# Patient Record
Sex: FEMALE | Race: WHITE | NOT HISPANIC OR LATINO | URBAN - METROPOLITAN AREA
[De-identification: names, ages, dates, MRNs, and addresses within clinical notes are randomized per-mention and may not be internally consistent; named-entity substitution may affect disease eponyms.]

---

## 2017-04-07 ENCOUNTER — EMERGENCY (EMERGENCY)
Facility: HOSPITAL | Age: 27
LOS: 1 days | End: 2017-04-07
Attending: INTERNAL MEDICINE | Admitting: INTERNAL MEDICINE
Payer: COMMERCIAL

## 2017-04-07 VITALS
DIASTOLIC BLOOD PRESSURE: 68 MMHG | HEIGHT: 63 IN | RESPIRATION RATE: 16 BRPM | WEIGHT: 158.73 LBS | OXYGEN SATURATION: 98 % | TEMPERATURE: 98 F | HEART RATE: 88 BPM | SYSTOLIC BLOOD PRESSURE: 119 MMHG

## 2017-04-07 VITALS
TEMPERATURE: 98 F | HEART RATE: 74 BPM | DIASTOLIC BLOOD PRESSURE: 71 MMHG | RESPIRATION RATE: 14 BRPM | SYSTOLIC BLOOD PRESSURE: 120 MMHG | OXYGEN SATURATION: 96 %

## 2017-04-07 DIAGNOSIS — Z90.49 ACQUIRED ABSENCE OF OTHER SPECIFIED PARTS OF DIGESTIVE TRACT: Chronic | ICD-10-CM

## 2017-04-07 PROCEDURE — 96374 THER/PROPH/DIAG INJ IV PUSH: CPT

## 2017-04-07 PROCEDURE — 72040 X-RAY EXAM NECK SPINE 2-3 VW: CPT

## 2017-04-07 PROCEDURE — 99284 EMERGENCY DEPT VISIT MOD MDM: CPT | Mod: 25

## 2017-04-07 PROCEDURE — 72040 X-RAY EXAM NECK SPINE 2-3 VW: CPT | Mod: 26

## 2017-04-07 PROCEDURE — 99284 EMERGENCY DEPT VISIT MOD MDM: CPT

## 2017-04-07 RX ORDER — KETOROLAC TROMETHAMINE 30 MG/ML
60 SYRINGE (ML) INJECTION ONCE
Qty: 0 | Refills: 0 | Status: DISCONTINUED | OUTPATIENT
Start: 2017-04-07 | End: 2017-04-07

## 2017-04-07 RX ORDER — IBUPROFEN 200 MG
600 TABLET ORAL ONCE
Qty: 0 | Refills: 0 | Status: COMPLETED | OUTPATIENT
Start: 2017-04-07 | End: 2017-04-07

## 2017-04-07 RX ORDER — CYCLOBENZAPRINE HYDROCHLORIDE 10 MG/1
1 TABLET, FILM COATED ORAL
Qty: 21 | Refills: 0 | OUTPATIENT
Start: 2017-04-07 | End: 2017-04-14

## 2017-04-07 RX ORDER — DIAZEPAM 5 MG
5 TABLET ORAL ONCE
Qty: 0 | Refills: 0 | Status: DISCONTINUED | OUTPATIENT
Start: 2017-04-07 | End: 2017-04-07

## 2017-04-07 RX ADMIN — Medication 60 MILLIGRAM(S): at 18:00

## 2017-04-07 RX ADMIN — Medication 60 MILLIGRAM(S): at 17:13

## 2017-04-07 RX ADMIN — Medication 600 MILLIGRAM(S): at 16:29

## 2017-04-07 RX ADMIN — Medication 600 MILLIGRAM(S): at 15:48

## 2017-04-07 RX ADMIN — Medication 5 MILLIGRAM(S): at 15:48

## 2017-04-07 NOTE — ED ADULT NURSE NOTE - OBJECTIVE STATEMENT
Patient walked into ER with mother c/o right neck pain upon awakening at 4:30 AM.  Pain constant and severe.  Patient has neck cushion around neck for comfort.

## 2017-04-07 NOTE — ED PROVIDER NOTE - OBJECTIVE STATEMENT
at 430 am today was moving head and felt pain that also radiatesto both sides upper back.no h/o same, no paresthesias or weakness..pain now somewhat better than earlier.no fever,trauma....

## 2018-03-23 NOTE — ED PROVIDER NOTE - CPE EDP GASTRO NORM
3/27/2018    Humza Moore   [address]  [facility] [address]     Dear Humza:    I have reviewed your lab work done recently at the office.  Please take note of the following results and recommendations.              Normal Values          Results  Sodium       135-145 142   Creatinine      0.50-1.10 1.42   Potassium        3.4-5.1 4.2   Blood Sugar         65-99 183                       Glycohemogloin (Hgb A1c)     Less than 7 7.6   Total Cholesterol   Less than 200    139   Triglycerides   Less than 150 113   HDL (good cholesterol)      Above 50 57   LDL (bad cholesterol)   Less than 100 59   AST (liver enzyme)   Less than 38 17   ALT (liver enzyme)   Less than 79   29   White blood cell 4.2 - 11.0    Hemoglobin 12.0-15.5    Hematocrit 36.0 - 46.5    Platelets 140-450    Other:         Recommendations:  Per phone call here is your test results. I hope all is going well for you.  If you have any questions or concerns, please feel free to call the office at [phone].    Sincerely,        Delroy Loya M.D.  [address] [place] [address]  
normal...

## 2024-01-07 ENCOUNTER — EMERGENCY (EMERGENCY)
Facility: HOSPITAL | Age: 34
LOS: 1 days | Discharge: HOME | End: 2024-01-07
Attending: EMERGENCY MEDICINE | Admitting: EMERGENCY MEDICINE
Payer: COMMERCIAL

## 2024-01-07 VITALS
SYSTOLIC BLOOD PRESSURE: 128 MMHG | WEIGHT: 184.97 LBS | HEIGHT: 63 IN | RESPIRATION RATE: 20 BRPM | HEART RATE: 109 BPM | OXYGEN SATURATION: 98 % | TEMPERATURE: 99 F | DIASTOLIC BLOOD PRESSURE: 83 MMHG

## 2024-01-07 DIAGNOSIS — Z90.49 ACQUIRED ABSENCE OF OTHER SPECIFIED PARTS OF DIGESTIVE TRACT: Chronic | ICD-10-CM

## 2024-01-07 LAB
ALBUMIN SERPL ELPH-MCNC: 3.8 G/DL — SIGNIFICANT CHANGE UP (ref 3.3–5)
ALBUMIN SERPL ELPH-MCNC: 3.8 G/DL — SIGNIFICANT CHANGE UP (ref 3.3–5)
ALP SERPL-CCNC: 73 U/L — SIGNIFICANT CHANGE UP (ref 40–120)
ALP SERPL-CCNC: 73 U/L — SIGNIFICANT CHANGE UP (ref 40–120)
ALT FLD-CCNC: 29 U/L — SIGNIFICANT CHANGE UP (ref 12–78)
ALT FLD-CCNC: 29 U/L — SIGNIFICANT CHANGE UP (ref 12–78)
ANION GAP SERPL CALC-SCNC: 0 MMOL/L — LOW (ref 5–17)
ANION GAP SERPL CALC-SCNC: 0 MMOL/L — LOW (ref 5–17)
AST SERPL-CCNC: 23 U/L — SIGNIFICANT CHANGE UP (ref 15–37)
AST SERPL-CCNC: 23 U/L — SIGNIFICANT CHANGE UP (ref 15–37)
BASOPHILS # BLD AUTO: 0.08 K/UL — SIGNIFICANT CHANGE UP (ref 0–0.2)
BASOPHILS # BLD AUTO: 0.08 K/UL — SIGNIFICANT CHANGE UP (ref 0–0.2)
BASOPHILS NFR BLD AUTO: 1 % — SIGNIFICANT CHANGE UP (ref 0–2)
BASOPHILS NFR BLD AUTO: 1 % — SIGNIFICANT CHANGE UP (ref 0–2)
BILIRUB SERPL-MCNC: 0.3 MG/DL — SIGNIFICANT CHANGE UP (ref 0.2–1.2)
BILIRUB SERPL-MCNC: 0.3 MG/DL — SIGNIFICANT CHANGE UP (ref 0.2–1.2)
BUN SERPL-MCNC: 9 MG/DL — SIGNIFICANT CHANGE UP (ref 7–23)
BUN SERPL-MCNC: 9 MG/DL — SIGNIFICANT CHANGE UP (ref 7–23)
CALCIUM SERPL-MCNC: 8.3 MG/DL — LOW (ref 8.5–10.1)
CALCIUM SERPL-MCNC: 8.3 MG/DL — LOW (ref 8.5–10.1)
CHLORIDE SERPL-SCNC: 109 MMOL/L — HIGH (ref 96–108)
CHLORIDE SERPL-SCNC: 109 MMOL/L — HIGH (ref 96–108)
CO2 SERPL-SCNC: 28 MMOL/L — SIGNIFICANT CHANGE UP (ref 22–31)
CO2 SERPL-SCNC: 28 MMOL/L — SIGNIFICANT CHANGE UP (ref 22–31)
CREAT SERPL-MCNC: 0.64 MG/DL — SIGNIFICANT CHANGE UP (ref 0.5–1.3)
CREAT SERPL-MCNC: 0.64 MG/DL — SIGNIFICANT CHANGE UP (ref 0.5–1.3)
EGFR: 119 ML/MIN/1.73M2 — SIGNIFICANT CHANGE UP
EGFR: 119 ML/MIN/1.73M2 — SIGNIFICANT CHANGE UP
EOSINOPHIL # BLD AUTO: 0.29 K/UL — SIGNIFICANT CHANGE UP (ref 0–0.5)
EOSINOPHIL # BLD AUTO: 0.29 K/UL — SIGNIFICANT CHANGE UP (ref 0–0.5)
EOSINOPHIL NFR BLD AUTO: 3.6 % — SIGNIFICANT CHANGE UP (ref 0–6)
EOSINOPHIL NFR BLD AUTO: 3.6 % — SIGNIFICANT CHANGE UP (ref 0–6)
GLUCOSE SERPL-MCNC: 98 MG/DL — SIGNIFICANT CHANGE UP (ref 70–99)
GLUCOSE SERPL-MCNC: 98 MG/DL — SIGNIFICANT CHANGE UP (ref 70–99)
HCG SERPL-ACNC: <1 MIU/ML — SIGNIFICANT CHANGE UP
HCG SERPL-ACNC: <1 MIU/ML — SIGNIFICANT CHANGE UP
HCT VFR BLD CALC: 39.4 % — SIGNIFICANT CHANGE UP (ref 34.5–45)
HCT VFR BLD CALC: 39.4 % — SIGNIFICANT CHANGE UP (ref 34.5–45)
HGB BLD-MCNC: 13.4 G/DL — SIGNIFICANT CHANGE UP (ref 11.5–15.5)
HGB BLD-MCNC: 13.4 G/DL — SIGNIFICANT CHANGE UP (ref 11.5–15.5)
IMM GRANULOCYTES NFR BLD AUTO: 0.2 % — SIGNIFICANT CHANGE UP (ref 0–0.9)
IMM GRANULOCYTES NFR BLD AUTO: 0.2 % — SIGNIFICANT CHANGE UP (ref 0–0.9)
LYMPHOCYTES # BLD AUTO: 2.95 K/UL — SIGNIFICANT CHANGE UP (ref 1–3.3)
LYMPHOCYTES # BLD AUTO: 2.95 K/UL — SIGNIFICANT CHANGE UP (ref 1–3.3)
LYMPHOCYTES # BLD AUTO: 36.3 % — SIGNIFICANT CHANGE UP (ref 13–44)
LYMPHOCYTES # BLD AUTO: 36.3 % — SIGNIFICANT CHANGE UP (ref 13–44)
MCHC RBC-ENTMCNC: 29.9 PG — SIGNIFICANT CHANGE UP (ref 27–34)
MCHC RBC-ENTMCNC: 29.9 PG — SIGNIFICANT CHANGE UP (ref 27–34)
MCHC RBC-ENTMCNC: 34 GM/DL — SIGNIFICANT CHANGE UP (ref 32–36)
MCHC RBC-ENTMCNC: 34 GM/DL — SIGNIFICANT CHANGE UP (ref 32–36)
MCV RBC AUTO: 87.9 FL — SIGNIFICANT CHANGE UP (ref 80–100)
MCV RBC AUTO: 87.9 FL — SIGNIFICANT CHANGE UP (ref 80–100)
MONOCYTES # BLD AUTO: 0.68 K/UL — SIGNIFICANT CHANGE UP (ref 0–0.9)
MONOCYTES # BLD AUTO: 0.68 K/UL — SIGNIFICANT CHANGE UP (ref 0–0.9)
MONOCYTES NFR BLD AUTO: 8.4 % — SIGNIFICANT CHANGE UP (ref 2–14)
MONOCYTES NFR BLD AUTO: 8.4 % — SIGNIFICANT CHANGE UP (ref 2–14)
NEUTROPHILS # BLD AUTO: 4.11 K/UL — SIGNIFICANT CHANGE UP (ref 1.8–7.4)
NEUTROPHILS # BLD AUTO: 4.11 K/UL — SIGNIFICANT CHANGE UP (ref 1.8–7.4)
NEUTROPHILS NFR BLD AUTO: 50.5 % — SIGNIFICANT CHANGE UP (ref 43–77)
NEUTROPHILS NFR BLD AUTO: 50.5 % — SIGNIFICANT CHANGE UP (ref 43–77)
NRBC # BLD: 0 /100 WBCS — SIGNIFICANT CHANGE UP (ref 0–0)
NRBC # BLD: 0 /100 WBCS — SIGNIFICANT CHANGE UP (ref 0–0)
PLATELET # BLD AUTO: 249 K/UL — SIGNIFICANT CHANGE UP (ref 150–400)
PLATELET # BLD AUTO: 249 K/UL — SIGNIFICANT CHANGE UP (ref 150–400)
POTASSIUM SERPL-MCNC: 3.9 MMOL/L — SIGNIFICANT CHANGE UP (ref 3.5–5.3)
POTASSIUM SERPL-MCNC: 3.9 MMOL/L — SIGNIFICANT CHANGE UP (ref 3.5–5.3)
POTASSIUM SERPL-SCNC: 3.9 MMOL/L — SIGNIFICANT CHANGE UP (ref 3.5–5.3)
POTASSIUM SERPL-SCNC: 3.9 MMOL/L — SIGNIFICANT CHANGE UP (ref 3.5–5.3)
PROT SERPL-MCNC: 7.2 G/DL — SIGNIFICANT CHANGE UP (ref 6–8.3)
PROT SERPL-MCNC: 7.2 G/DL — SIGNIFICANT CHANGE UP (ref 6–8.3)
RBC # BLD: 4.48 M/UL — SIGNIFICANT CHANGE UP (ref 3.8–5.2)
RBC # BLD: 4.48 M/UL — SIGNIFICANT CHANGE UP (ref 3.8–5.2)
RBC # FLD: 12.9 % — SIGNIFICANT CHANGE UP (ref 10.3–14.5)
RBC # FLD: 12.9 % — SIGNIFICANT CHANGE UP (ref 10.3–14.5)
SODIUM SERPL-SCNC: 137 MMOL/L — SIGNIFICANT CHANGE UP (ref 135–145)
SODIUM SERPL-SCNC: 137 MMOL/L — SIGNIFICANT CHANGE UP (ref 135–145)
TROPONIN I, HIGH SENSITIVITY RESULT: 4.6 NG/L — SIGNIFICANT CHANGE UP
TROPONIN I, HIGH SENSITIVITY RESULT: 4.6 NG/L — SIGNIFICANT CHANGE UP
WBC # BLD: 8.13 K/UL — SIGNIFICANT CHANGE UP (ref 3.8–10.5)
WBC # BLD: 8.13 K/UL — SIGNIFICANT CHANGE UP (ref 3.8–10.5)
WBC # FLD AUTO: 8.13 K/UL — SIGNIFICANT CHANGE UP (ref 3.8–10.5)
WBC # FLD AUTO: 8.13 K/UL — SIGNIFICANT CHANGE UP (ref 3.8–10.5)

## 2024-01-07 PROCEDURE — 70450 CT HEAD/BRAIN W/O DYE: CPT | Mod: 26,MA

## 2024-01-07 PROCEDURE — 99285 EMERGENCY DEPT VISIT HI MDM: CPT | Mod: 25

## 2024-01-07 PROCEDURE — 84702 CHORIONIC GONADOTROPIN TEST: CPT

## 2024-01-07 PROCEDURE — 36415 COLL VENOUS BLD VENIPUNCTURE: CPT

## 2024-01-07 PROCEDURE — 84484 ASSAY OF TROPONIN QUANT: CPT

## 2024-01-07 PROCEDURE — 93010 ELECTROCARDIOGRAM REPORT: CPT

## 2024-01-07 PROCEDURE — 93005 ELECTROCARDIOGRAM TRACING: CPT

## 2024-01-07 PROCEDURE — 80053 COMPREHEN METABOLIC PANEL: CPT

## 2024-01-07 PROCEDURE — 71046 X-RAY EXAM CHEST 2 VIEWS: CPT

## 2024-01-07 PROCEDURE — 71046 X-RAY EXAM CHEST 2 VIEWS: CPT | Mod: 26

## 2024-01-07 PROCEDURE — 82962 GLUCOSE BLOOD TEST: CPT

## 2024-01-07 PROCEDURE — 70450 CT HEAD/BRAIN W/O DYE: CPT | Mod: MA

## 2024-01-07 PROCEDURE — 85025 COMPLETE CBC W/AUTO DIFF WBC: CPT

## 2024-01-07 PROCEDURE — 99285 EMERGENCY DEPT VISIT HI MDM: CPT

## 2024-01-07 RX ORDER — SODIUM CHLORIDE 9 MG/ML
1000 INJECTION INTRAMUSCULAR; INTRAVENOUS; SUBCUTANEOUS ONCE
Refills: 0 | Status: COMPLETED | OUTPATIENT
Start: 2024-01-07 | End: 2024-01-07

## 2024-01-07 RX ADMIN — SODIUM CHLORIDE 1000 MILLILITER(S): 9 INJECTION INTRAMUSCULAR; INTRAVENOUS; SUBCUTANEOUS at 18:32

## 2024-01-07 NOTE — ED PROVIDER NOTE - NSFOLLOWUPINSTRUCTIONS_ED_ALL_ED_FT
Follow up with your primary care physician within 2-3 days.  Follow up with the cardiologist as discussed. Take the copy of your test results you were given in the emergency room for your doctor to review.     Stay hydrated    Return to the ER if your symptoms worsen or for any other medical emergencies  ************    Closed Head Injury    A closed head injury is an injury to your head that may or may not involve a traumatic brain injury (TBI). Symptoms of TBI can be short or long lasting and include headache, dizziness, interference with memory or speech, fatigue, confusion, changes in sleep, mood changes, nausea, depression/anxiety, and dulling of senses. Make sure to obtain proper rest which includes getting plenty of sleep, avoiding excessive visual stimulation, and avoiding activities that may cause physical or mental stress. Avoid any situation where there is potential for another head injury, including sports.    SEEK IMMEDIATE MEDICAL CARE IF YOU HAVE ANY OF THE FOLLOWING SYMPTOMS: unusual drowsiness, vomiting, severe dizziness, seizures, lightheadedness, muscular weakness, different pupil sizes, visual changes, or clear or bloody discharge from your ears or nose.

## 2024-01-07 NOTE — ED PROVIDER NOTE - CARE PROVIDERS DIRECT ADDRESSES
[email or web address]

## 2024-01-07 NOTE — ED ADULT NURSE NOTE - CHIEF COMPLAINT QUOTE
Patient BIB  with c/o she was eating something, something got stuck in esophagus, water would not dislodge, tried to run to back door to throw up and blacked out and fell, reports she woke up on her knees, reports she was shaking but "as I was coming to I was shaking".  reports she hit her head on the door. Patient reports after event her family gave her orange juice and drink with electrolytes. Patient reports she did not drink much water yesterday, GOT  YESTERDAY, Patient denies significant PMHx.  in triage. Patient tearful and crying in triage,  reports they are going on honeymoon soon and he wants to make sure her head is OK.

## 2024-01-07 NOTE — ED ADULT TRIAGE NOTE - CHIEF COMPLAINT QUOTE
Patient BIB  with c/o she was eating something, something got stuck in esophagus, water would not dislodge, tried to run to back door to throw up and blacked out and fell, reports she woke up on her knees, reports she was shaking but "as I was coming to I was shaking".  reports she hit her head on the door. Patient reports after event her family gave her orange juice and drink with electrolyes. Patient reports she did not drink much water yesterday, GOT  YESTERDAY, Patient denies significant PMHx.  in triage. Patient BIB  with c/o she was eating something, something got stuck in esophagus, water would not dislodge, tried to run to back door to throw up and blacked out and fell, reports she woke up on her knees, reports she was shaking but "as I was coming to I was shaking".  reports she hit her head on the door. Patient reports after event her family gave her orange juice and drink with electrolytes. Patient reports she did not drink much water yesterday, GOT  YESTERDAY, Patient denies significant PMHx.  in triage. Patient tearful and crying in triage,  reports they are going on honeymoon soon and he wants to make sure her head is OK.

## 2024-01-07 NOTE — ED PROVIDER NOTE - CARE PLAN
1 Principal Discharge DX:	[facility] (closed head injury), initial encounter  Secondary Diagnosis:	Choking episode

## 2024-01-07 NOTE — ED ADULT NURSE REASSESSMENT NOTE - NS ED NURSE REASSESS COMMENT FT1
pt reeval and cleared for safe discharge as per MD. pt alert. pt ambulates with steady gait. IV access removed. pt given discharge instructions and teaching paperwork.

## 2024-01-07 NOTE — ED ADULT NURSE NOTE - NSFALLUNIVINTERV_ED_ALL_ED
Bed/Stretcher in lowest position, wheels locked, appropriate side rails in place/Call bell, personal items and telephone in reach/Instruct patient to call for assistance before getting out of bed/chair/stretcher/Non-slip footwear applied when patient is off stretcher/[place] to call system/Physically safe environment - no spills, clutter or unnecessary equipment/Purposeful proactive rounding/Room/bathroom lighting operational, light cord in reach Bed/Stretcher in lowest position, wheels locked, appropriate side rails in place/Call bell, personal items and telephone in reach/Instruct patient to call for assistance before getting out of bed/chair/stretcher/Non-slip footwear applied when patient is off stretcher/[place] to call system/Physically safe environment - no spills, clutter or unnecessary equipment/Purposeful proactive rounding/Room/bathroom lighting operational, light cord in reach

## 2024-01-07 NOTE — ED PROVIDER NOTE - PHYSICAL EXAMINATION
Gen: Well appearing in NAD.   Eyes: PERRLA, EOMI   ENT: oral mucosa dry   Head: +small superficial abrasion on the left forehead  Heart: s1/s2, RRR  Lung: CTA b/l  Abd: soft, NT/ND, no rebound or guarding   Msk: No C-spine or mid spinal tenderness to palpation.  Patient ambulatory in the ED with normal gait.  Neuro: AAO x3, patient moving all extremity equally, no focal neuro deficits noted  Skin: Normal for race.  Psych: Alert and oriented

## 2024-01-07 NOTE — ED PROVIDER NOTE - OBJECTIVE STATEMENT
33-year-old female no reported past medical history presents to the ED with complaint of a choking episode this morning which caused her to pass out and hit her head for a few seconds.  Event was witnessed by family members at home.  Patient reports she no longer feels like food is stuck in her esophagus however per  at bedside they came because they wanted a CAT scan for the head, being that they are going on honeymoon tomorrow and just wanted to make sure everything was okay.  Patient reports they did get  yesterday and she was not hydrating or eating yesterday, they were drinking all night.  Reports she was eating a bagel this morning when she choked on it.  Reports she feels better now.  Denies any chest pain, shortness of breath, headache, visual changes, nausea vomiting, abdominal pain or all other complaints

## 2024-01-07 NOTE — ED PROVIDER NOTE - CLINICAL SUMMARY MEDICAL DECISION MAKING FREE TEXT BOX
33-year-old female with no significant past medical history presents with was  yesterday, was celebrating and had a large party.  Patient had very little to eat during all this.  This morning she finally went to eat, had a bagel and ended up choking on the bagel.  Patient states the bagel got stuck in her esophagus, she did not have any shortness of breath or coughing during the episode.  Patient tried to drink some water, vomited and then passed out briefly.  Patient may have hit her head.  LOC for only a few seconds.  Patient never had chest pain or shortness of breath.  No acute palpitations.  Patient has been feeling dehydrated today, some fatigue.  Patient is not having any other shortness of breath or chest pain.  No dyspnea on exertion or easy fatigue.  No further passing out or near syncopal episodes.  No neck or back pain.  No numbness or tingling.  Patient is having some headache today.  Patient came to the ED for a CAT scan.  No aggravating or alleviating factors otherwise noted.  No other acute injury or complaints.  Exam: Nontoxic, well-appearing.  Normal respiratory effort.  CTA BL, no W/R/R.  Normal cardiac exam.  Abdomen soft, nontender, nondistended.  No external signs of significant head trauma.  No spinal tenderness in cervical, thoracic, or lumbar.  Normal nonfocal neurologic exam.  No C/C/E.  No other acute findings on exam.  Acute syncopal episode status post partial choking episode.  No signs of aspiration or URI symptoms.  Neurologically intact.  Patient now much improved after IV fluids.  Labs, CT, outpatient follow-up

## 2024-01-07 NOTE — ED ADULT NURSE REASSESSMENT NOTE - NS ED NURSE REASSESS COMMENT FT1
pt received from day shift. pt alert. pt resting comfortably on stretcher, pt in no acute distress. pt safety maintained.

## 2024-01-07 NOTE — ED ADULT TRIAGE NOTE - ESI TRIAGE ACUITY LEVEL, MLM
Bed/Stretcher in lowest position, wheels locked, appropriate side rails in place/Call bell, personal items and telephone in reach/Instruct patient to call for assistance before getting out of bed/chair/stretcher/Non-slip footwear applied when patient is off stretcher/[place] to call system/Physically safe environment - no spills, clutter or unnecessary equipment/Purposeful proactive rounding/Room/bathroom lighting operational, light cord in reach 3

## 2024-01-07 NOTE — ED PROVIDER NOTE - CARE PROVIDER_API CALL
Carmela Kee  Cardiology  [address] [place] [address]  Phone: [phone]  Fax: [phone]  Follow Up Time:    Carmela Kee  Cardiology  [address] [place] [address]  Phone: [phone]  Fax: [phone]  Follow Up Time:

## 2024-01-07 NOTE — ED PROVIDER NOTE - PRINCIPAL DIAGNOSIS
eMERGENCY dEPARTMENT eNCOUnter      CHIEF COMPLAINT    No chief complaint on file.      HPI    Sherry Schmidt is a 68 year old female who presents with a chief complaint of vision change and left hand numbness.  Around 2:30PM today, she developed a feeling of vision change that lasted for less than a minute.  She actually states symptoms are hard to describe and she backed away from vision change and described a lightheaded feeling more than anything.  She was standing at the time and did not lose balance, fall, stagger or have syncope.  She felt better than 5 minutes later felt numbness in the left hand only that lasted for 3 minutes and felt better when she shook her hand out like you might do when your hand is \"asleep.\"  Had felt at baseline since.  No headache.  Has a history of HTN and states BP is always much higher in office or hospital.  Taking ASA 81 daily with metoprolol and losartan/HCTZ    ALLERGIES    ALLERGIES:  No Known Allergies    CURRENT MEDICATIONS:    No current facility-administered medications on file prior to encounter.  LORazepam (ATIVAN) 1 MG tablet  losartan-hydrochlorothiazide (HYZAAR) 100-12.5 MG per tablet  metoPROLOL succinate (TOPROL-XL) 25 MG 24 hr tablet  aspirin 81 MG EC tablet        PAST MEDICAL HISTORY    HTN    SURGICAL HISTORY    None    SOCIAL HISTORY    Social History     Tobacco Use   • Smoking status: Never Smoker   • Smokeless tobacco: Never Used   No drugs      FAMILY HISTORY    No family history on file.    REVIEW OF SYSTEMS    All systems reviewed and are negative except for the pertinent positives and negatives in HPI.    PHYSICAL EXAM    Triage vital signs reviewed and are stable.  She is hypertensive.    Constitutional:  Well developed, well nourished. No acute distress, non-toxic appearance.   Eyes:  Pupils equal, round, reactive to light. Conjunctivae normal.   HENT:  Atraumatic. External ears normal. Nose normal. Oropharynx moist.  Neck: Normal range of  motion. No tenderness. Supple.   Respiratory:  No respiratory distress, normal breath sounds. No rales. No wheezing.   Cardiovascular:  Normal rate, normal rhythm. No murmurs, gallops, or rubs.  GI:  Soft, nondistended. Normal bowel sounds, nontender. No hepatomegaly, splenomegaly, mass, rebound or guarding.   :  No costovertebral angle tenderness.   Musculoskeletal:  No edema, tenderness, or deformities. Back - no tenderness.   Integument:  Well hydrated, no rash.   Lymphatic:  No lymphadenopathy noted.   Neurologic:  NIHSS stroke scale is negative on my personal exam.  Gait is normal.  Psychiatric:  Speech and behavior appropriate.     Preliminary EKG interpretation by the ED physician:   Normal sinus rhythm with rate of 66, no STEMI or acute ischemic changes.    RADIOLOGY    CT head appears negative for acute per my preliminary interpretation.    CTA head/neck negative for acute.  No significant cerebrovascular disease    LABS    Reviewed    PROCEDURES    None    ED COURSE & MEDICAL DECISION MAKING    May have had tiny TIA.  Hard to know.  Hand felt better by \"shaking it out.\"  No sequela since.  Age and HTN are risk factors.  Therefore, CT head and CTA head/neck ordered to assess/screen for degree of cerebrovascular disease.  Patient agreeable.     6:36 PM planning discharge.  Patient states her lipid panel 9 months ago was great and she is not on a statin.  I encouraged her to follow-up with her primary care in [place] this week to consider repeating a fasting lipid panel, echocardiogram and rechecking her blood pressure.  If her symptoms return, she should seek emergency medicine care immediately.  She has had no symptoms in the emergency department in her angiogram looks great.  I could have had a TIA but I am not 100% convinced.  She will remain on a baby aspirin.      FINAL IMPRESSION    TIA  Hypertension, established         Zack Gooden MD  08/08/21 2801     [facility] (closed head injury), initial encounter

## 2024-01-07 NOTE — ED PROVIDER NOTE - PATIENT PORTAL LINK FT
You can access the FollowMyHealth Patient Portal offered by [facility] by registering at the following website: [email or web address]. By joining [employer]’s FollowMyHealth portal, you will also be able to view your health information using other applications (apps) compatible with our system. You can access the FollowMyHealth Patient Portal offered by [facility] by registering at the following website: [email or web address]. By joining [employer]’s FollowMyHealth portal, you will also be able to view your health information using other applications (apps) compatible with our system.

## 2024-01-07 NOTE — ED PROVIDER NOTE - PROGRESS NOTE DETAILS
MEI Buck: Labs reviewed, Trop negative, head CT negative, chest x-ray images reviewed no acute findings.  EKG showed T wave inversion in V2 and V3, no prior, will instruct patient to follow-up with cardiology outpatient and with her PCP.

## 2024-06-17 PROBLEM — Z00.00 ENCOUNTER FOR PREVENTIVE HEALTH EXAMINATION: Status: ACTIVE | Noted: 2024-06-17

## 2024-06-24 ENCOUNTER — APPOINTMENT (OUTPATIENT)
Dept: PEDIATRIC CARDIOLOGY | Facility: CLINIC | Age: 34
End: 2024-06-24
Payer: COMMERCIAL

## 2024-06-24 PROCEDURE — 93325 DOPPLER ECHO COLOR FLOW MAPG: CPT

## 2024-06-24 PROCEDURE — 76827 ECHO EXAM OF FETAL HEART: CPT

## 2024-06-24 PROCEDURE — 99202 OFFICE O/P NEW SF 15 MIN: CPT | Mod: 25

## 2024-06-24 PROCEDURE — 76825 ECHO EXAM OF FETAL HEART: CPT
